# Patient Record
(demographics unavailable — no encounter records)

---

## 2025-05-21 NOTE — HISTORY OF PRESENT ILLNESS
[FreeTextEntry1] : HPI: Patient is a 38yo female presenting for her transfer of care visit - from [place] All records in [place] LMP: 10/21/24 ROBBIE: 25 Gestational Age today: 30w2d Patient is without complaints today. Denies pain or VB.   Office US: +[facility]   OB Hx:    Gyn Hx: No known fibroids, ovarian cysts, or other gynecologic problems   PMH: unspecified hepatic vs biliary disease PSH: denies Meds: PNV All: NKDA SH: neg x 3   Additional Exam:   Neck: no palpable goiter   Gyn: Normal appearing external genitalia, normal appearing vagina and cervix, no CMT   Breast: No lymphadenopathy in the neck, chest wall, bilateral supraclavicular, infraclavicular, and bilateral axillary areas. No overt asymmetry in bilateral breast contour with normal appearing skin and normal appearing nipple areolar complex bilaterally. No nipple discharge expressed.   Plan: Routine PNC [x] ACOG labs - T&S, CBC, hgb electrophoresis, rubella, rubeola, varicella, UA/UCx, Lead, HIV/Hep B&C/RPR/GC/CT sent [ ] Influenza vaccine [x] Covid vaccine and booster recommendations reviewed with the patient [ ] Expanded Carrier Screening - genetics consult in [place] [x] NT sono / Seq 1 normal per pt [x] NIPT LR, F per pt in [place] [x] Nursing interview / packet / [place] scanned [x] Seq 2 normal per pt [x] Anatomy sono normal per pt [x] 1hr GCT / CBC / syphilis - normal per pt [ ] Tdap vaccine  - reviewed,  and in-laws recieved in last month, pt will consider [ ] GBS/HIV/CBC/Syphilis   AMA - discussed aneuploidy testing and invasive testing options - ASA at 12 weeks - 3rd trimester growth US - Antepartum testing    Unspecified Hepatic vs Biliary Disease - pt recalls being under surveillance, limited in medications she can take and has to modify diet to low fat. - CMP, Bile Acids sent today - MFM consult placed  RTO 2wks 45min spent excluding cristobal Tamayo MD

## 2025-06-03 NOTE — DATA REVIEWED
[FreeTextEntry1] : Sonogram today shows good fetal growth with the overall size at the 18th percentile.   Review of [facility] labwork shows normal LFTs and Bile acids. There are no hematological parameters suggesting any acute hepatic disease  Her symptoms are purely RUQ discomfort, which she reports has been much improved over the past few months. There is some increasing pressure she is feeling, likely related to the growing pregnancy.   I gave parameters for her to call if symptoms change and the potential benefits of evaluation post partum

## 2025-06-03 NOTE — HISTORY OF PRESENT ILLNESS
Caller would like to speak with a RN. Writer advised caller of callback within 24-72 hours.    Patient Name: Karla Pelletier  Caller Name: Patient  Name of Facility:   Callback Number: [phone]  Fax Number:   Additional Info: Patient has not had her menstrual cycle being six days late.  Today she is having extreme lower back pain to the point where she states even when she moves a little, she wants to cry because of the pain.  Please return her call.-  Did you confirm the message with the caller?: yes    Thank you,  Dunia Whitfield     [FreeTextEntry1] : Shereen is a late transfer of care from [place]. She reports a long history of "liver and gall bladder" issues, but no specifica diagnosis has ever been made. She reports self managing her symptoms of discomfort with diet alone.  She declines any intervention at this time and wishes to wait until after delivery for a referral to GI

## 2025-06-03 NOTE — DISCUSSION/SUMMARY
[FreeTextEntry1] : Repeat sonogram as clinically indicated  Followup with GI if symptoms worsen  No specific changes in recommendations from routine OB care for now.

## 2025-07-30 NOTE — HISTORY OF PRESENT ILLNESS
[FreeTextEntry1] : HPI: 36yo female presents for pain in the perineum and anus, likely hemorrhoids. She is s/p  on  with a first deg laceration. Denies bleeding or fevers/chills  Gyn: perineum appears to be healing well without evidence of infection. Large external hemorrhoid seen  Plan Reviewed hemorrhoid and lidocaine-hydrocortisone cream rx'd Discussed healing process and precautions Reviewed laxatives and stool softners 20min spent R MD Roni